# Patient Record
Sex: MALE | Race: WHITE | NOT HISPANIC OR LATINO | Employment: STUDENT | URBAN - METROPOLITAN AREA
[De-identification: names, ages, dates, MRNs, and addresses within clinical notes are randomized per-mention and may not be internally consistent; named-entity substitution may affect disease eponyms.]

---
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
## 2021-01-08 ENCOUNTER — TELEMEDICINE (OUTPATIENT)
Dept: FAMILY MEDICINE CLINIC | Facility: CLINIC | Age: 21
End: 2021-01-08
Payer: COMMERCIAL

## 2021-01-08 DIAGNOSIS — Z20.822 CLOSE EXPOSURE TO COVID-19 VIRUS: Primary | ICD-10-CM

## 2021-01-08 PROCEDURE — 99213 OFFICE O/P EST LOW 20 MIN: CPT | Performed by: NURSE PRACTITIONER

## 2021-01-08 NOTE — PROGRESS NOTES
COVID-19 Virtual Visit     Assessment/Plan:    Problem List Items Addressed This Visit     None      Visit Diagnoses     Close exposure to COVID-19 virus    -  Primary    Relevant Orders    Novel Coronavirus (COVID-19), PCR [facility] - Collected at [facility] or [facility]         Disposition:     I recommended COVID-19 PCR testing on or after day 5 since last exposure and if negative can end quarantine after 7 days  Patient was instructed to watch for symptoms until 14 days after exposure  If patient were to develop symptoms, they should immediately self isolate and call our office for further guidance  I referred patient to one of our centralized sites for a COVID-19 swab  I have spent 15 minutes directly with the patient  Greater than 50% of this time was spent in counseling/coordination of care regarding: instructions for management, patient and family education and impressions  Encounter provider CONSTANTINE Parisi    Provider located at [address] [place] [address]  [phone]    Recent Visits  No visits were found meeting these conditions  Showing recent visits within past 7 days and meeting all other requirements     Today's Visits  Date Type Provider Dept   01/08/21 [facility]   Showing today's visits and meeting all other requirements     Future Appointments  No visits were found meeting these conditions  Showing future appointments within next 150 days and meeting all other requirements        Patient agrees to participate in a virtual check in via telephone or video visit instead of presenting to the office to address urgent/immediate medical needs  Patient is aware this is a billable service  After connecting through Telephone, the patient was identified by name and date of birth   Collie Neigh was informed that this was a telemedicine visit and that the exam was being conducted confidentially over secure lines  Rajwinder Guzman acknowledged consent and understanding of privacy and security of the telemedicine visit  I informed the patient that I have reviewed his record in Epic and presented the opportunity for him to ask any questions regarding the visit today  The patient agreed to participate  It was my intent to perform this visit via video technology but the patient was not able to do a video connection so the visit was completed via audio telephone only  Subjective:   Rajwinder Guzman is a 24 y o  male who is concerned about COVID-19  Patient is currently asymptomatic  Patient denies fever, chills, fatigue, malaise, congestion, rhinorrhea, sore throat, anosmia, loss of taste, cough, shortness of breath, chest tightness, abdominal pain, nausea, vomiting, diarrhea, myalgias and headaches       Date of exposure: 1/6/2021    Exposure:   Contact with a person who is under investigation (PUI) for or who is positive for COVID-19 within the last 14 days?: Yes    Hospitalized recently for fever and/or lower respiratory symptoms?: No      Currently a healthcare worker that is involved in direct patient care?: No      Works in a special setting where the risk of COVID-19 transmission may be high? (this may include long-term care, correctional and intermediate facilities; homeless shelters; assisted-living facilities and group homes ): No      Resident in a special setting where the risk of COVID-19 transmission may be high? (this may include long-term care, correctional and intermediate facilities; homeless shelters; assisted-living facilities and group homes ): No      No results found for: [address] [facility] [address] [facility], [address] [facility] [address]  Past Medical History:   Diagnosis Date    Allergic      Past Surgical History:   Procedure Laterality Date    MYRINGOTOMY W/ TUBES       Current Outpatient Medications   Medication Sig Dispense Refill    fluticasone (FLONASE) 50 mcg/act nasal spray 2 sprays into each nostril daily 16 g 0    loratadine (CLARITIN) 5 MG chewable tablet Chew 1 tablet daily      MELATONIN GUMMIES PO Take 10 mg by mouth 2 at bedtime      terbinafine (LamISIL) 250 mg tablet   0     No current facility-administered medications for this visit  Allergies   Allergen Reactions    Sulfa Antibiotics      Pt unsure of reaction       Review of Systems   Constitutional: Negative for chills, fatigue and fever  HENT: Negative for congestion, rhinorrhea and sore throat  Respiratory: Negative for cough, chest tightness and shortness of breath  Gastrointestinal: Negative for abdominal pain, diarrhea, nausea and vomiting  Musculoskeletal: Negative for myalgias  Neurological: Negative for headaches  Objective: There were no vitals filed for this visit  It was my intent to perform this visit via video technology but the patient was not able to do a video connection so the visit was completed via audio telephone only  VIRTUAL VISIT DISCLAIMER    Luis Salgado acknowledges that he has consented to an online visit or consultation  He understands that the online visit is based solely on information provided by him, and that, in the absence of a face-to-face physical evaluation by the physician, the diagnosis he receives is both limited and provisional in terms of accuracy and completeness  This is not intended to replace a full medical face-to-face evaluation by the physician  Luis Salgado understands and accepts these terms

## 2021-01-11 DIAGNOSIS — Z20.822 CLOSE EXPOSURE TO COVID-19 VIRUS: ICD-10-CM

## 2021-01-11 PROCEDURE — U0003 INFECTIOUS AGENT DETECTION BY NUCLEIC ACID (DNA OR RNA); SEVERE ACUTE RESPIRATORY SYNDROME CORONAVIRUS 2 (SARS-COV-2) (CORONAVIRUS DISEASE [COVID-19]), AMPLIFIED PROBE TECHNIQUE, MAKING USE OF HIGH THROUGHPUT TECHNOLOGIES AS DESCRIBED BY CMS-2020-01-R: HCPCS | Performed by: NURSE PRACTITIONER

## 2021-01-11 PROCEDURE — U0005 INFEC AGEN DETEC AMPLI PROBE: HCPCS | Performed by: NURSE PRACTITIONER

## 2021-01-12 ENCOUNTER — TELEPHONE (OUTPATIENT)
Dept: FAMILY MEDICINE CLINIC | Facility: CLINIC | Age: 21
End: 2021-01-12

## 2021-01-12 LAB — SARS-COV-2 RNA SPEC QL NAA+PROBE: NOT DETECTED

## 2021-01-12 NOTE — TELEPHONE ENCOUNTER
Eugenio's dad called to see if Eugenio's covid results were in yet  Relayed Jaylyn's message to Joselyn Mendoza       Please record

## 2021-01-19 DIAGNOSIS — Z20.828 SARS-ASSOCIATED CORONAVIRUS EXPOSURE: Primary | ICD-10-CM

## 2021-01-20 LAB — SARS-COV-2 IGG SERPL QL IA: NEGATIVE

## 2021-05-03 ENCOUNTER — TELEPHONE (OUTPATIENT)
Dept: FAMILY MEDICINE CLINIC | Facility: CLINIC | Age: 21
End: 2021-05-03

## 2021-05-03 NOTE — TELEPHONE ENCOUNTER
Needs to have his Meningococcal shot for college    Please pend the order    He is scheduled for a nurse visit on 5/7

## 2021-05-07 ENCOUNTER — CLINICAL SUPPORT (OUTPATIENT)
Dept: FAMILY MEDICINE CLINIC | Facility: CLINIC | Age: 21
End: 2021-05-07
Payer: COMMERCIAL

## 2021-05-07 DIAGNOSIS — Z23 NEED FOR MENINGITIS VACCINATION: Primary | ICD-10-CM

## 2021-05-07 PROCEDURE — 90471 IMMUNIZATION ADMIN: CPT

## 2021-05-07 PROCEDURE — 90734 MENACWYD/MENACWYCRM VACC IM: CPT

## 2021-07-07 ENCOUNTER — APPOINTMENT (OUTPATIENT)
Dept: RADIOLOGY | Facility: CLINIC | Age: 21
End: 2021-07-07
Payer: COMMERCIAL

## 2021-07-07 ENCOUNTER — OFFICE VISIT (OUTPATIENT)
Dept: OBGYN CLINIC | Facility: CLINIC | Age: 21
End: 2021-07-07
Payer: COMMERCIAL

## 2021-07-07 VITALS — SYSTOLIC BLOOD PRESSURE: 126 MMHG | HEART RATE: 68 BPM | DIASTOLIC BLOOD PRESSURE: 78 MMHG

## 2021-07-07 DIAGNOSIS — S82.392A CLOSED FRACTURE OF POSTERIOR MALLEOLUS OF LEFT TIBIA, INITIAL ENCOUNTER: ICD-10-CM

## 2021-07-07 DIAGNOSIS — M25.572 PAIN, JOINT, ANKLE AND FOOT, LEFT: Primary | ICD-10-CM

## 2021-07-07 DIAGNOSIS — M25.572 PAIN, JOINT, ANKLE AND FOOT, LEFT: ICD-10-CM

## 2021-07-07 PROCEDURE — 99204 OFFICE O/P NEW MOD 45 MIN: CPT | Performed by: ORTHOPAEDIC SURGERY

## 2021-07-07 PROCEDURE — 1036F TOBACCO NON-USER: CPT | Performed by: ORTHOPAEDIC SURGERY

## 2021-07-07 PROCEDURE — 27767 CLTX POST ANKLE FX: CPT | Performed by: ORTHOPAEDIC SURGERY

## 2021-07-07 PROCEDURE — 73610 X-RAY EXAM OF ANKLE: CPT

## 2021-07-07 NOTE — LETTER
July 7, 2021     Patient: Antony Hubbard   YOB: 2000   Date of Visit: 7/7/2021       To Whom it May Concern:    Randa Houser is under my professional care  He was seen in my office on 7/7/2021  He is restricted from work at this time  Next visit in 4 weeks  If you have any questions or concerns, please don't hesitate to call  Sincerely,          Lowell Goff MD        CC: Randolph Gutierrez

## 2021-07-07 NOTE — PROGRESS NOTES
Assessment/Plan:  1  Pain, joint, ankle and foot, left  XR ankle 3+ vw left   2  Closed fracture of posterior malleolus of left tibia, initial encounter  Fracture / Dislocation Treatment       Scribe Attestation    I,:  Crispinjossie Denis Call am acting as a scribe while in the presence of the attending physician :       I,:  Ashley Khalil MD personally performed the services described in this documentation    as scribed in my presence :       Fracture / Dislocation Treatment    Date/Time: 7/7/2021 3:55 PM  Performed by: Ashley Khalil MD  Authorized by: Ashley Khalil MD     Patient Location:  Clinic  Other Assisting Provider: No    Verbal consent obtained?: Yes    Risks and benefits: Risks, benefits and alternatives were discussed    Consent given by:  Patient  Patient states understanding of procedure being performed: Yes    Patient's understanding of procedure matches consent: Yes    Patient identity confirmed:  Verbally with patient  Time out: Immediately prior to the procedure a time out was called    Injury location:  Ankle  Location details:  Left ankle  Injury type:  Fracture  Fracture type: posterior malleolus    Neurovascular status: Neurovascularly intact    Distal perfusion: normal    Neurological function: normal    Range of motion: normal    Local anesthesia used?: No    Manipulation performed?: No    Immobilization:  Other (comment) (Short cam walker)  Neurovascular status: Neurovascularly intact    Distal perfusion: normal    Neurological function: normal    Range of motion: normal    Patient tolerance:  Patient tolerated the procedure well with no immediate complications          Upon review of the x-rays and my physical examination Evelyn Fajardo has a small nondisplaced fracture located the posterior malleolus  This will heal well non operative treatment  He was fitted with a short Cam walker boot  He was instructed on its use and care  He can bear weight as tolerated    He is currently working in construction  I do not feel he is fit to return to work at this time and have restricted him for the next 4 weeks  I will see him back in 4 weeks for repeat x-rays  Subjective:   Montrell Hodge is a 24 y o  male who presents to the office today for evaluation of his left ankle  Ten days ago he was vacationing in [facility]  He was on top of a lifeguard  stand and a friend attempted to tip it over  Lety Slot jumped from the stand landing on the left ankle in an inverted position  He developed immediate and sharp pain at the lateral aspect of the left ankle that would radiate posteriorly  He was unable to bear weight on the left lower extremity  He states he did receive x-rays in the emergency department the next day which were found to be negative for fracture  He does not have a copy of those images or a report  Today his chief complaint is of a persistent mild to moderate ache in the posterior aspect of the left ankle that will radiate to the lateral region  Bearing weight on the left lower extremity will cause an exacerbation of his symptoms and they can become more moderate and sharp  He has been using crutches since his injury  He will apply ice and elevate the lower extremity which seems to help  He denies distal paresthesias  Review of Systems   Constitutional: Negative for chills, fever and unexpected weight change  HENT: Negative for hearing loss, nosebleeds and sore throat  Eyes: Negative for pain, redness and visual disturbance  Respiratory: Negative for cough, shortness of breath and wheezing  Cardiovascular: Negative for chest pain, palpitations and leg swelling  Gastrointestinal: Negative for abdominal pain, nausea and vomiting  Endocrine: Negative for polyphagia and polyuria  Genitourinary: Negative for dysuria and hematuria  Musculoskeletal:        See HPI   Skin: Negative for rash and wound  Neurological: Negative for dizziness, numbness and headaches  Psychiatric/Behavioral: Negative for decreased concentration and suicidal ideas  The patient is not nervous/anxious  Past Medical History:   Diagnosis Date    Allergic        Past Surgical History:   Procedure Laterality Date    MYRINGOTOMY W/ TUBES         Family History   Problem Relation Age of Onset    No Known Problems Mother     Hypertension Father         [employer]    Substance Abuse Neg Hx     Mental illness Neg Hx        Social History     Occupational History    Not on file   Tobacco Use    Smoking status: Never Smoker    Smokeless tobacco: Never Used   Vaping Use    Vaping Use: Never used   Substance and Sexual Activity    Alcohol use: Yes    Drug use: No    Sexual activity: Not on file         Current Outpatient Medications:     fluticasone (FLONASE) 50 mcg/act nasal spray, 2 sprays into each nostril daily, Disp: 16 g, Rfl: 0    loratadine (CLARITIN) 5 MG chewable tablet, Chew 1 tablet daily, Disp: , Rfl:     MELATONIN GUMMIES PO, Take 10 mg by mouth 2 at bedtime (Patient not taking: Reported on 7/7/2021), Disp: , Rfl:     terbinafine (LamISIL) 250 mg tablet, , Disp: , Rfl: 0    Allergies   Allergen Reactions    Sulfa Antibiotics      Pt unsure of reaction       Objective:  Vitals:    07/07/21 1511   BP: 126/78   Pulse: 68       Left Ankle Exam     Tenderness   Left ankle tenderness location: Posterior malleolus  Swelling: moderate    Range of Motion   Dorsiflexion: 10   Plantar flexion: 40   Eversion: normal   Inversion: normal     Muscle Strength   Left ankle normal muscle strength: All 4+/5  Dorsiflexion:  4/5   Plantar flexion:  4/5   Anterior tibial:  4/5   Posterior tibial:  4/5  Gastrocsoleus:  4/5  Peroneal muscle:  4/5    Tests   Anterior drawer: negative  Varus tilt: negative    Other   Erythema: absent  Scars: absent  Sensation: normal  Pulse: present (2+ DP)    Comments:  Ecchymosis present          Observations   Left Ankle/Foot   Negative for adhesive scar  Strength/Myotome Testing     Left Ankle/Foot   Dorsiflexion: 4  Plantar flexion: 4      Physical Exam  Vitals reviewed  Constitutional:       Appearance: He is well-developed  HENT:      Head: Normocephalic and atraumatic  Eyes:      General:         Right eye: No discharge  Left eye: No discharge  Conjunctiva/sclera: Conjunctivae normal    Cardiovascular:      Rate and Rhythm: Regular rhythm  Pulmonary:      Effort: Pulmonary effort is normal  No respiratory distress  Musculoskeletal:      Cervical back: Normal range of motion and neck supple  Skin:     General: Skin is warm and dry  Neurological:      Mental Status: He is alert and oriented to person, place, and time  Psychiatric:         Behavior: Behavior normal            I have personally reviewed pertinent films in PACS and my interpretation is as follows:  X-rays of the left ankle demonstrate a small nondisplaced fracture at the posterior malleolus  There is no other osseous abnormality

## 2021-07-14 ENCOUNTER — TELEPHONE (OUTPATIENT)
Dept: OBGYN CLINIC | Facility: CLINIC | Age: 21
End: 2021-07-14

## 2021-07-14 ENCOUNTER — TELEPHONE (OUTPATIENT)
Dept: FAMILY MEDICINE CLINIC | Facility: CLINIC | Age: 21
End: 2021-07-14

## 2021-07-15 NOTE — TELEPHONE ENCOUNTER
COMPLETED ONLINE DISABILITY  LVM ADVISING THIS WAS COMPLETED AND A COPY IS AVAILABLE FOR  AT  [facility] OFFICE)

## 2021-08-04 ENCOUNTER — OFFICE VISIT (OUTPATIENT)
Dept: OBGYN CLINIC | Facility: CLINIC | Age: 21
End: 2021-08-04

## 2021-08-04 ENCOUNTER — APPOINTMENT (OUTPATIENT)
Dept: RADIOLOGY | Facility: CLINIC | Age: 21
End: 2021-08-04
Payer: COMMERCIAL

## 2021-08-04 DIAGNOSIS — S82.392A CLOSED FRACTURE OF POSTERIOR MALLEOLUS OF LEFT TIBIA, INITIAL ENCOUNTER: ICD-10-CM

## 2021-08-04 DIAGNOSIS — S82.392D CLOSED FRACTURE OF POSTERIOR MALLEOLUS OF LEFT TIBIA WITH ROUTINE HEALING, SUBSEQUENT ENCOUNTER: Primary | ICD-10-CM

## 2021-08-04 PROCEDURE — 99024 POSTOP FOLLOW-UP VISIT: CPT | Performed by: ORTHOPAEDIC SURGERY

## 2021-08-04 PROCEDURE — 73610 X-RAY EXAM OF ANKLE: CPT

## 2021-08-04 NOTE — LETTER
August 4, 2021     Patient: Berenice Dobbs   YOB: 2000   Date of Visit: 8/4/2021       To Whom it May Concern:    Nickolas Mora is under my professional care  He was seen in my office on 8/4/2021  He is cleared for work on 8/9/21  If you have any questions or concerns, please don't hesitate to call           Sincerely,          Chuyita Hilliard MD        CC: No Recipients

## 2021-08-04 NOTE — PROGRESS NOTES
Assessment/Plan:  1  Closed fracture of posterior malleolus of left tibia with routine healing, subsequent encounter  XR ankle 3+ vw left     The patient is doing well and I can not appreciate his small fracture on xrays today  He can discontinue his boot at this point and is cleared to return to work on Monday  He can gradually increase his activity as tolerated  He will follow-up as needed  Subjective:   Melvin Schmid is a 24 y o  male who presents today for follow-up of his left ankle, now about 4 weeks status post closed treatment of posterior malleolus fracture  He has been WBAT in his boot  He denies any pain with ambulation in his boot  He has also not some ambulating out of his boot, with only some mild soreness with certain steps  Review of Systems      Past Medical History:   Diagnosis Date    Allergic        Past Surgical History:   Procedure Laterality Date    MYRINGOTOMY W/ TUBES         Family History   Problem Relation Age of Onset    No Known Problems Mother     Hypertension Father         [employer]    Substance Abuse Neg Hx     Mental illness Neg Hx        Social History     Occupational History    Not on file   Tobacco Use    Smoking status: Never Smoker    Smokeless tobacco: Never Used   Vaping Use    Vaping Use: Never used   Substance and Sexual Activity    Alcohol use: Yes    Drug use: No    Sexual activity: Not on file         Current Outpatient Medications:     fluticasone (FLONASE) 50 mcg/act nasal spray, 2 sprays into each nostril daily, Disp: 16 g, Rfl: 0    loratadine (CLARITIN) 5 MG chewable tablet, Chew 1 tablet daily, Disp: , Rfl:     MELATONIN GUMMIES PO, Take 10 mg by mouth 2 at bedtime (Patient not taking: Reported on 7/7/2021), Disp: , Rfl:     terbinafine (LamISIL) 250 mg tablet, , Disp: , Rfl: 0    Allergies   Allergen Reactions    Sulfa Antibiotics      Pt unsure of reaction       Objective: There were no vitals filed for this visit      Left Ankle Exam     Tenderness   The patient is experiencing no tenderness  Swelling: none    Range of Motion   Dorsiflexion: 20   Plantar flexion: 30     Other   Erythema: absent  Sensation: normal  Pulse: present            Physical Exam    I have personally reviewed pertinent films in PACS and my interpretation is as follows:  Xrays left ankle: Negative

## 2021-09-14 ENCOUNTER — TELEMEDICINE (OUTPATIENT)
Dept: FAMILY MEDICINE CLINIC | Facility: CLINIC | Age: 21
End: 2021-09-14
Payer: COMMERCIAL

## 2021-09-14 VITALS — WEIGHT: 147 LBS | TEMPERATURE: 98.8 F | BODY MASS INDEX: 25.1 KG/M2 | HEIGHT: 64 IN

## 2021-09-14 DIAGNOSIS — J02.9 SORE THROAT: ICD-10-CM

## 2021-09-14 DIAGNOSIS — R05.9 COUGH: ICD-10-CM

## 2021-09-14 DIAGNOSIS — J06.9 VIRAL UPPER RESPIRATORY TRACT INFECTION: Primary | ICD-10-CM

## 2021-09-14 DIAGNOSIS — R09.89 CHEST CONGESTION: ICD-10-CM

## 2021-09-14 PROCEDURE — 99213 OFFICE O/P EST LOW 20 MIN: CPT | Performed by: NURSE PRACTITIONER

## 2021-09-14 PROCEDURE — U0005 INFEC AGEN DETEC AMPLI PROBE: HCPCS | Performed by: NURSE PRACTITIONER

## 2021-09-14 PROCEDURE — 3725F SCREEN DEPRESSION PERFORMED: CPT | Performed by: NURSE PRACTITIONER

## 2021-09-14 PROCEDURE — U0003 INFECTIOUS AGENT DETECTION BY NUCLEIC ACID (DNA OR RNA); SEVERE ACUTE RESPIRATORY SYNDROME CORONAVIRUS 2 (SARS-COV-2) (CORONAVIRUS DISEASE [COVID-19]), AMPLIFIED PROBE TECHNIQUE, MAKING USE OF HIGH THROUGHPUT TECHNOLOGIES AS DESCRIBED BY CMS-2020-01-R: HCPCS | Performed by: NURSE PRACTITIONER

## 2021-09-14 PROCEDURE — 3008F BODY MASS INDEX DOCD: CPT | Performed by: NURSE PRACTITIONER

## 2021-09-14 RX ORDER — CETIRIZINE HYDROCHLORIDE 10 MG/1
10 TABLET, CHEWABLE ORAL DAILY
COMMUNITY

## 2021-09-14 NOTE — PROGRESS NOTES
COVID-19 Outpatient Progress Note    Assessment/Plan:    Problem List Items Addressed This Visit     None      Visit Diagnoses     Viral upper respiratory tract infection    -  Primary    Recommend supportive care only at this time  Afebrile  Not vaccinated for covid  Will evaluate for covid infection  Cough        Relevant Orders    Novel Coronavirus (Covid-19),PCR [facility] - Collected at [facility]    Sore throat        Relevant Orders    Novel Coronavirus (Covid-19),PCR [facility] - Collected at [facility]    Chest congestion        Relevant Medications    cetirizine (ZyrTEC) 10 MG chewable tablet    Other Relevant Orders    Novel Coronavirus (Covid-19),PCR [facility] - Collected at [facility]         Increase fluid intake as tolerated  Rest and humidification   Continue medications as directed   - antibiotic for full course  - pro-biotic to protect stomach while on medication   - Flonase OTC 1-2 sprays each nostril daily PRN post nasal drip   - Mucinex OTC to loosen secretions   Return to office in one week if symptoms persist or worsen        Disposition:     I referred patient to one of our centralized sites for a COVID-19 swab  I have spent 15 minutes directly with the patient  Greater than 50% of this time was spent in counseling/coordination of care regarding: instructions for management and impressions  Verification of patient location:    Patient is located in the following state in which I hold an active license [place]    Encounter provider [facility]    Provider located at [address] [place] [address]  [id number]    Recent Visits  No visits were found meeting these conditions    Showing recent visits within past 7 days and meeting all other requirements  Today's Visits  Date Type Provider Dept   09/14/21 Telemedicine [facility], [address] [id number] [facility]   Showing today's visits and meeting all other requirements  Future Appointments  No visits were found meeting these conditions  Showing future appointments within next 150 days and meeting all other requirements     This virtual check-in was done via [employer] and patient was informed that this is a secure, HIPAA-compliant platform  He agrees to proceed  Patient agrees to participate in a virtual check in via telephone or video visit instead of presenting to the office to address urgent/immediate medical needs  Patient is aware this is a billable service  After connecting through [facility], the patient was identified by name and date of birth  Bella Jimenez was informed that this was a telemedicine visit and that the exam was being conducted confidentially over secure lines  My office door was closed  No one else was in the room  Bella Jimenez acknowledged consent and understanding of privacy and security of the telemedicine visit  I informed the patient that I have reviewed his record in Epic and presented the opportunity for him to ask any questions regarding the visit today  The patient agreed to participate  Subjective:   Bella Jimenez is a 24 y o  male who is concerned about COVID-19  Patient's symptoms include nasal congestion, cough and chest tightness (chest congestion)  Patient denies fever, chills, fatigue, malaise, rhinorrhea, sore throat, anosmia, loss of taste, shortness of breath, abdominal pain, nausea, vomiting, diarrhea, myalgias and headaches  Date of symptom onset: 9/13/2021  COVID-19 vaccination status: Not vaccinated    Exposure:   Contact with a person who is under investigation (PUI) for or who is positive for COVID-19 within the last 14 days?: No    Hospitalized recently for fever and/or lower respiratory symptoms?: No      Currently a healthcare worker that is involved in direct patient care?: No      Works in a special setting where the risk of COVID-19 transmission may be high? (this may include long-term care, correctional and FDC facilities; homeless shelters; assisted-living facilities and group homes ): No      Resident in a special setting where the risk of COVID-19 transmission may be high? (this may include long-term care, correctional and FDC facilities; homeless shelters; assisted-living facilities and group homes ): No      Lab Results   Component Value Date    SARSCOV2 Not Detected 01/11/2021     Past Medical History:   Diagnosis Date    Allergic      Past Surgical History:   Procedure Laterality Date    MYRINGOTOMY W/ TUBES       Current Outpatient Medications   Medication Sig Dispense Refill    cetirizine (ZyrTEC) 10 MG chewable tablet Chew 10 mg daily      fluticasone (FLONASE) 50 mcg/act nasal spray 2 sprays into each nostril daily 16 g 0    loratadine (CLARITIN) 5 MG chewable tablet Chew 1 tablet daily (Patient not taking: Reported on 9/14/2021)      MELATONIN GUMMIES PO Take 10 mg by mouth 2 at bedtime (Patient not taking: Reported on 7/7/2021)      terbinafine (LamISIL) 250 mg tablet  (Patient not taking: Reported on 7/7/2021)  0     No current facility-administered medications for this visit  Allergies   Allergen Reactions    Sulfa Antibiotics      Pt unsure of reaction       Review of Systems   Constitutional: Negative for chills, fatigue and fever  HENT: Positive for congestion  Negative for postnasal drip, rhinorrhea, sinus pressure, sinus pain and sore throat  Respiratory: Positive for cough and chest tightness (chest congestion)  Negative for shortness of breath  Cardiovascular: Negative for chest pain  Gastrointestinal: Negative for abdominal pain, diarrhea, nausea and vomiting  Musculoskeletal: Negative for myalgias  Neurological: Negative for headaches         Objective:    Vitals:    09/14/21 0836   Temp: 98 8 °F (37 1 °C)   TempSrc: Oral   Weight: 66 7 kg (147 lb)   Height: 5' 4" (1 626 m)       Physical Exam  Vitals reviewed  Constitutional:       Appearance: Normal appearance  He is ill-appearing  HENT:      Head: Normocephalic and atraumatic  Neurological:      Mental Status: He is alert and oriented to person, place, and time  Psychiatric:         Mood and Affect: Mood normal          VIRTUAL VISIT [address] verbally agrees to participate in [employer]  Pt is aware that [employer] could be limited without vital signs or the ability to perform a full hands-on physical exam  Eugenio Randle understands he or the provider may request at any time to terminate the video visit and request the patient to seek care or treatment in person

## 2022-03-31 ENCOUNTER — OFFICE VISIT (OUTPATIENT)
Dept: FAMILY MEDICINE CLINIC | Facility: CLINIC | Age: 22
End: 2022-03-31
Payer: COMMERCIAL

## 2022-03-31 VITALS
HEART RATE: 72 BPM | RESPIRATION RATE: 12 BRPM | OXYGEN SATURATION: 99 % | WEIGHT: 153 LBS | HEIGHT: 64 IN | TEMPERATURE: 97.7 F | DIASTOLIC BLOOD PRESSURE: 70 MMHG | SYSTOLIC BLOOD PRESSURE: 114 MMHG | BODY MASS INDEX: 26.12 KG/M2

## 2022-03-31 DIAGNOSIS — J30.1 SEASONAL ALLERGIC RHINITIS DUE TO POLLEN: ICD-10-CM

## 2022-03-31 DIAGNOSIS — Z00.00 ENCOUNTER FOR ANNUAL GENERAL MEDICAL EXAMINATION WITHOUT ABNORMAL FINDINGS IN ADULT: Primary | ICD-10-CM

## 2022-03-31 PROCEDURE — 99395 PREV VISIT EST AGE 18-39: CPT | Performed by: NURSE PRACTITIONER

## 2022-03-31 RX ORDER — DIPHENOXYLATE HYDROCHLORIDE AND ATROPINE SULFATE 2.5; .025 MG/1; MG/1
1 TABLET ORAL DAILY
COMMUNITY

## 2022-03-31 NOTE — PROGRESS NOTES
FAMILY [facility] HEALTH MAINTENANCE OFFICE VISIT  [facility]    NAME: Melissa Bella  AGE: 22 y o  SEX: male  : 2000     DATE: 3/31/2022    Assessment and Plan     1  Encounter for annual general medical examination without abnormal findings in adult  Comments:  Age appropriate screenings and recommendations discussed  2  Seasonal allergic rhinitis due to pollen  Assessment & Plan:  Taking flonase and zyrtec daily  Symptoms stable, well-controlled  Completed physical forms  · Patient Counseling:   · Nutrition: Stressed importance of a well balanced diet, moderation of sodium/saturated fat, caloric balance and sufficient intake of fiber  · Exercise: Stressed the importance of regular exercise with a goal of 150 minutes per week  · Dental Health: Discussed daily flossing and brushing and regular dental visits   · Sexuality: Discussed sexually transmitted infections, use of condoms and prevention of unintended pregnancy  · Alcohol Use:  Recommended moderation of alcohol intake  · Injury Prevention: Discussed Safety Belts, Safety Helmets, and Smoke Detectors    · Immunizations reviewed: Risks and Benefits discussed and Declined recommended vaccinations  · Discussed benefits of:  Screening labs   BMI Counseling: Body mass index is 26 26 kg/m²  Discussed with patient's BMI with him  No follow-ups on file          Chief Complaint     Chief Complaint   Patient presents with    Annual Exam       History of Present Illness     HPI    Well Adult Physical   Patient here for a comprehensive physical exam       Diet and Physical Activity  Diet: well balanced diet  Exercise: frequently      Depression Screen  PHQ-2/9 Depression Screening    Little interest or pleasure in doing things: 0 - not at all  Feeling down, depressed, or hopeless: 0 - not at all  PHQ-2 Score: 0  PHQ-2 Interpretation: Negative depression screen          General Health  Hearing: Normal:  bilateral  Vision: no vision problems  Dental: regular dental visits    Reproductive Health  No issues       The following portions of the patient's history were reviewed and updated as appropriate: allergies, current medications, past family history, past medical history, past social history, past surgical history and problem list     Review of Systems     Review of Systems   Constitutional: Negative for diaphoresis, fatigue and fever  HENT: Negative for ear pain and hearing loss  Eyes: Negative for pain and visual disturbance  Respiratory: Negative for chest tightness and shortness of breath  Cardiovascular: Negative for chest pain, palpitations and leg swelling  Gastrointestinal: Negative for abdominal pain, constipation and diarrhea  Genitourinary: Negative for difficulty urinating  Musculoskeletal: Negative for arthralgias and myalgias  Skin: Negative for rash  Neurological: Negative for dizziness, numbness and headaches  Psychiatric/Behavioral: Negative for sleep disturbance  Past Medical History     Past Medical History:   Diagnosis Date    Allergic        Past Surgical History     Past Surgical History:   Procedure Laterality Date    MYRINGOTOMY W/ TUBES         Social History     Social History     Socioeconomic History    Marital status: Single     Spouse name: None    Number of children: None    Years of education: None    Highest education level: None   Occupational History    None   Tobacco Use    Smoking status: Never Smoker    Smokeless tobacco: Never Used   Vaping Use    Vaping Use: Never used   Substance and Sexual Activity    Alcohol use:  Yes    Drug use: No    Sexual activity: None   Other Topics Concern    None   Social History Narrative    None     Social Determinants of Health     Financial Resource Strain: Not on file   Food Insecurity: Not on file   Transportation Needs: Not on file   Physical Activity: Not on file   Stress: Not on file   Social Connections: Not on file   Intimate Partner Violence: Not on file   Housing Stability: Not on file       Family History     Family History   Problem Relation Age of Onset    No Known Problems Mother     Hypertension Father         [employer]    Substance Abuse Neg Hx     Mental illness Neg Hx        Current Medications       Current Outpatient Medications:     cetirizine (ZyrTEC) 10 MG chewable tablet, Chew 10 mg daily, Disp: , Rfl:     Ferrous Sulfate (IRON PO), Take by mouth in the morning, Disp: , Rfl:     fluticasone (FLONASE) 50 mcg/act nasal spray, 2 sprays into each nostril daily, Disp: 16 g, Rfl: 0    multivitamin (THERAGRAN) TABS, Take 1 tablet by mouth daily, Disp: , Rfl:     Omega-3 Fatty Acids (FISH OIL PO), Take by mouth in the morning, Disp: , Rfl:      Allergies     Allergies   Allergen Reactions    Sulfa Antibiotics      Pt unsure of reaction       Objective     /70 (BP Location: Left arm, Patient Position: Sitting, Cuff Size: Adult)   Pulse 72   Temp 97 7 °F (36 5 °C) (Temporal)   Resp 12   Ht 5' 4" (1 626 m)   Wt 69 4 kg (153 lb)   SpO2 99%   BMI 26 26 kg/m²      Physical Exam  Vitals reviewed  Constitutional:       General: He is not in acute distress  Appearance: Normal appearance  He is well-developed  He is not diaphoretic  HENT:      Head: Normocephalic and atraumatic  Right Ear: Tympanic membrane, ear canal and external ear normal       Left Ear: Tympanic membrane, ear canal and external ear normal    Eyes:      General: Lids are normal       Extraocular Movements: Extraocular movements intact  Conjunctiva/sclera: Conjunctivae normal       Pupils: Pupils are equal, round, and reactive to light  Pupils are equal       Funduscopic exam:     Right eye: No hemorrhage or exudate  Red reflex present  Left eye: No hemorrhage or exudate  Red reflex present  Neck:      Thyroid: No thyroid mass or thyromegaly        Vascular: No carotid bruit  Cardiovascular:      Rate and Rhythm: Normal rate and regular rhythm  Pulses: Normal pulses  Heart sounds: Normal heart sounds, S1 normal and S2 normal  No murmur heard  Pulmonary:      Effort: Pulmonary effort is normal       Breath sounds: Normal breath sounds  No decreased breath sounds, wheezing, rhonchi or rales  Chest:   Breasts:      Right: No supraclavicular adenopathy  Left: No supraclavicular adenopathy  Abdominal:      General: Bowel sounds are normal  There is no distension  Palpations: Abdomen is soft  There is no hepatomegaly or splenomegaly  Tenderness: There is no abdominal tenderness  Genitourinary:     Penis: Normal        Testes: Normal       Prostate: Normal       Rectum: Normal    Musculoskeletal:         General: No tenderness or deformity  Normal range of motion  Cervical back: Full passive range of motion without pain, normal range of motion and neck supple  Right lower leg: No edema  Left lower leg: No edema  Lymphadenopathy:      Cervical: No cervical adenopathy  Upper Body:      Right upper body: No supraclavicular adenopathy  Left upper body: No supraclavicular adenopathy  Skin:     General: Skin is warm and dry  Findings: No rash  Neurological:      Mental Status: He is alert and oriented to person, place, and time  Cranial Nerves: No cranial nerve deficit  Coordination: Coordination normal       Deep Tendon Reflexes: Reflexes are normal and symmetric  Psychiatric:         Speech: Speech normal          Behavior: Behavior normal          Thought Content:  Thought content normal          Judgment: Judgment normal                Phu Hugo, [facility],[address]

## 2022-06-17 ENCOUNTER — OFFICE VISIT (OUTPATIENT)
Dept: FAMILY MEDICINE CLINIC | Facility: CLINIC | Age: 22
End: 2022-06-17
Payer: COMMERCIAL

## 2022-06-17 VITALS
RESPIRATION RATE: 16 BRPM | BODY MASS INDEX: 25.78 KG/M2 | SYSTOLIC BLOOD PRESSURE: 102 MMHG | OXYGEN SATURATION: 98 % | HEART RATE: 66 BPM | TEMPERATURE: 97.8 F | HEIGHT: 64 IN | WEIGHT: 151 LBS | DIASTOLIC BLOOD PRESSURE: 70 MMHG

## 2022-06-17 DIAGNOSIS — J30.2 SEASONAL ALLERGIES: Primary | ICD-10-CM

## 2022-06-17 DIAGNOSIS — J34.2 NASAL SEPTAL DEVIATION: ICD-10-CM

## 2022-06-17 DIAGNOSIS — J30.1 SEASONAL ALLERGIC RHINITIS DUE TO POLLEN: ICD-10-CM

## 2022-06-17 PROCEDURE — 3008F BODY MASS INDEX DOCD: CPT | Performed by: NURSE PRACTITIONER

## 2022-06-17 PROCEDURE — 1036F TOBACCO NON-USER: CPT | Performed by: NURSE PRACTITIONER

## 2022-06-17 PROCEDURE — 96372 THER/PROPH/DIAG INJ SC/IM: CPT | Performed by: NURSE PRACTITIONER

## 2022-06-17 PROCEDURE — 99214 OFFICE O/P EST MOD 30 MIN: CPT | Performed by: NURSE PRACTITIONER

## 2022-06-17 RX ORDER — TRIAMCINOLONE ACETONIDE 40 MG/ML
40 INJECTION, SUSPENSION INTRA-ARTICULAR; INTRAMUSCULAR ONCE
Status: COMPLETED | OUTPATIENT
Start: 2022-06-17 | End: 2022-06-17

## 2022-06-17 RX ADMIN — TRIAMCINOLONE ACETONIDE 40 MG: 40 INJECTION, SUSPENSION INTRA-ARTICULAR; INTRAMUSCULAR at 10:35

## 2022-06-17 NOTE — PROGRESS NOTES
Assessment/Plan:    1  Seasonal allergies  -     triamcinolone acetonide (KENALOG-40) 40 mg/mL injection 40 mg    2  Seasonal allergic rhinitis due to pollen  -     triamcinolone acetonide (KENALOG-40) 40 mg/mL injection 40 mg    3  Nasal septal deviation  -     Ambulatory Referral to Otolaryngology; Future          Return if symptoms worsen or fail to improve  Subjective:      Patient ID: Henrene Osler is a 22 y o  male  Chief Complaint   Patient presents with   Guanako Shari is a 22year old male who presents to the office for evaluation and management seasonal allergies  Reports associated symptoms include sneezing and runny nose with congestion, watery itchy eyes and occasionally having itchy ears  Pt reports taking Zyrtec which is helpful  The following portions of the patient's history were reviewed and updated as appropriate: allergies, current medications, past family history, past medical history, past social history, past surgical history and problem list     Review of Systems   Constitutional: Negative for chills, fatigue and fever  Respiratory: Negative for cough, chest tightness and shortness of breath  Cardiovascular: Negative for chest pain, palpitations and leg swelling  Current Outpatient Medications   Medication Sig Dispense Refill    cetirizine (ZyrTEC) 10 MG chewable tablet Chew 10 mg daily      Ferrous Sulfate (IRON PO) Take by mouth in the morning      fluticasone (FLONASE) 50 mcg/act nasal spray 2 sprays into each nostril daily 16 g 0    multivitamin (THERAGRAN) TABS Take 1 tablet by mouth daily      Omega-3 Fatty Acids (FISH OIL PO) Take by mouth in the morning       No current facility-administered medications for this visit  Objective:    /70   Pulse 66   Temp 97 8 °F (36 6 °C) (Temporal)   Resp 16   Ht 5' 4" (1 626 m)   Wt 68 5 kg (151 lb)   SpO2 98%   BMI 25 92 kg/m²        Physical Exam  Vitals reviewed     Constitutional: General: He is not in acute distress  Appearance: Normal appearance  He is well-developed  He is not diaphoretic  HENT:      Head: Normocephalic and atraumatic  Right Ear: Ear canal and external ear normal  No drainage, swelling or tenderness  No middle ear effusion  Tympanic membrane is scarred  Left Ear: Tympanic membrane, ear canal and external ear normal  No drainage, swelling or tenderness  No middle ear effusion  Nose: Septal deviation and rhinorrhea present  No mucosal edema  Rhinorrhea is clear  Right Sinus: No maxillary sinus tenderness or frontal sinus tenderness  Left Sinus: No maxillary sinus tenderness or frontal sinus tenderness  Mouth/Throat:      Pharynx: Uvula midline  No oropharyngeal exudate or posterior oropharyngeal erythema  Eyes:      General:         Right eye: No discharge  Left eye: No discharge  Conjunctiva/sclera: Conjunctivae normal    Neck:      Thyroid: No thyromegaly  Cardiovascular:      Rate and Rhythm: Normal rate and regular rhythm  Heart sounds: Normal heart sounds  Pulmonary:      Effort: Pulmonary effort is normal  No respiratory distress  Breath sounds: Normal breath sounds  No decreased breath sounds, wheezing, rhonchi or rales  Musculoskeletal:      Cervical back: Normal range of motion and neck supple  Lymphadenopathy:      Cervical: No cervical adenopathy  Skin:     General: Skin is warm and dry  Findings: No rash  Neurological:      Mental Status: He is alert and oriented to person, place, and time  Psychiatric:         Behavior: Behavior normal          Thought Content:  Thought content normal                 CONSTANTINE Mixon

## 2023-04-04 ENCOUNTER — OFFICE VISIT (OUTPATIENT)
Dept: FAMILY MEDICINE CLINIC | Facility: CLINIC | Age: 23
End: 2023-04-04

## 2023-04-04 VITALS
SYSTOLIC BLOOD PRESSURE: 112 MMHG | OXYGEN SATURATION: 98 % | DIASTOLIC BLOOD PRESSURE: 78 MMHG | RESPIRATION RATE: 12 BRPM | HEIGHT: 64 IN | WEIGHT: 152 LBS | BODY MASS INDEX: 25.95 KG/M2 | TEMPERATURE: 97.5 F | HEART RATE: 91 BPM

## 2023-04-04 DIAGNOSIS — J01.40 ACUTE NON-RECURRENT PANSINUSITIS: Primary | ICD-10-CM

## 2023-04-04 RX ORDER — AMOXICILLIN AND CLAVULANATE POTASSIUM 500; 125 MG/1; MG/1
1 TABLET, FILM COATED ORAL EVERY 12 HOURS SCHEDULED
Qty: 20 TABLET | Refills: 0 | Status: SHIPPED | OUTPATIENT
Start: 2023-04-04 | End: 2023-04-14

## 2023-04-04 NOTE — PROGRESS NOTES
Assessment/Plan:    1  Acute non-recurrent pansinusitis  -     amoxicillin-clavulanate (AUGMENTIN) 500-125 mg per tablet; Take 1 tablet by mouth every 12 (twelve) hours for 10 days            Patient Instructions   Increase fluid intake as tolerated  Rest and humidification   Continue medications as directed   - antibiotic for full course  - pro-biotic to protect stomach while on medication   - Flonase OTC 1-2 sprays each nostril daily PRN post nasal drip   - Mucinex OTC to loosen secretions   Return to office in one week if symptoms persist or worsen        Return if symptoms worsen or fail to improve  Subjective:      Patient ID: Nikkie Both is a 25 y o  male  Chief Complaint   Patient presents with   • Sore Throat     Pt has sore throat and sinus congestion for approximately 5 days  Sore Throat   This is a new problem  The current episode started in the past 7 days  The problem has been unchanged  There has been no fever  The pain is mild  Associated symptoms include congestion, coughing and a hoarse voice  Pertinent negatives include no abdominal pain, diarrhea, ear discharge, ear pain, headaches, plugged ear sensation, shortness of breath, trouble swallowing or vomiting  He has tried nothing for the symptoms  The following portions of the patient's history were reviewed and updated as appropriate: allergies, current medications, past family history, past medical history, past social history, past surgical history and problem list     Review of Systems   Constitutional: Negative for chills, fatigue and fever  HENT: Positive for congestion, hoarse voice, postnasal drip, sinus pressure and sore throat  Negative for ear discharge, ear pain, rhinorrhea, sinus pain and trouble swallowing  Respiratory: Positive for cough  Negative for shortness of breath  Gastrointestinal: Negative for abdominal pain, diarrhea and vomiting  Neurological: Negative for headaches           Current "Outpatient Medications   Medication Sig Dispense Refill   • amoxicillin-clavulanate (AUGMENTIN) 500-125 mg per tablet Take 1 tablet by mouth every 12 (twelve) hours for 10 days 20 tablet 0   • cetirizine (ZyrTEC) 10 MG chewable tablet Chew 10 mg daily     • Ferrous Sulfate (IRON PO) Take by mouth in the morning     • fluticasone (FLONASE) 50 mcg/act nasal spray 2 sprays into each nostril daily 16 g 0   • multivitamin (THERAGRAN) TABS Take 1 tablet by mouth daily     • Omega-3 Fatty Acids (FISH OIL PO) Take by mouth in the morning     • mupirocin (BACTROBAN) 2 % ointment LIBERALLY APPLY TO AFFECTED AREA BID (Patient not taking: Reported on 4/4/2023)       No current facility-administered medications for this visit  Objective:    /78 (BP Location: Left arm, Patient Position: Sitting, Cuff Size: Large)   Pulse 91   Temp 97 5 °F (36 4 °C) (Temporal)   Resp 12   Ht 5' 4\" (1 626 m)   Wt 68 9 kg (152 lb)   SpO2 98%   BMI 26 09 kg/m²        Physical Exam  Vitals reviewed  Constitutional:       General: He is not in acute distress  Appearance: He is well-developed  He is not diaphoretic  HENT:      Head: Normocephalic and atraumatic  Right Ear: Ear canal and external ear normal  No drainage, swelling or tenderness  A middle ear effusion is present  Tympanic membrane is erythematous  Left Ear: Ear canal and external ear normal  No drainage, swelling or tenderness  A middle ear effusion is present  Tympanic membrane is erythematous  Nose: Mucosal edema and rhinorrhea present  Right Sinus: No maxillary sinus tenderness or frontal sinus tenderness  Left Sinus: No maxillary sinus tenderness or frontal sinus tenderness  Mouth/Throat:      Pharynx: Uvula midline  Posterior oropharyngeal erythema present  No oropharyngeal exudate  Eyes:      General:         Right eye: No discharge  Left eye: No discharge        Conjunctiva/sclera: Conjunctivae normal    Neck:      " Thyroid: No thyromegaly  Cardiovascular:      Rate and Rhythm: Normal rate and regular rhythm  Heart sounds: Normal heart sounds  Pulmonary:      Effort: Pulmonary effort is normal  No respiratory distress  Breath sounds: Normal breath sounds  No decreased breath sounds, wheezing, rhonchi or rales  Musculoskeletal:      Cervical back: Normal range of motion and neck supple  Lymphadenopathy:      Cervical: No cervical adenopathy  Skin:     General: Skin is warm and dry  Findings: No rash  Neurological:      Mental Status: He is alert and oriented to person, place, and time  Psychiatric:         Behavior: Behavior normal          Thought Content:  Thought content normal                 CONSTANTINE Moran

## 2023-06-30 ENCOUNTER — APPOINTMENT (OUTPATIENT)
Dept: RADIOLOGY | Facility: CLINIC | Age: 23
End: 2023-06-30
Payer: COMMERCIAL

## 2023-06-30 ENCOUNTER — OFFICE VISIT (OUTPATIENT)
Dept: URGENT CARE | Facility: CLINIC | Age: 23
End: 2023-06-30
Payer: COMMERCIAL

## 2023-06-30 VITALS
OXYGEN SATURATION: 100 % | HEIGHT: 65 IN | HEART RATE: 79 BPM | TEMPERATURE: 97.5 F | WEIGHT: 154.6 LBS | BODY MASS INDEX: 25.76 KG/M2 | DIASTOLIC BLOOD PRESSURE: 70 MMHG | RESPIRATION RATE: 18 BRPM | SYSTOLIC BLOOD PRESSURE: 110 MMHG

## 2023-06-30 DIAGNOSIS — S99.922A TOE INJURY, LEFT, INITIAL ENCOUNTER: ICD-10-CM

## 2023-06-30 DIAGNOSIS — S99.922A TOE INJURY, LEFT, INITIAL ENCOUNTER: Primary | ICD-10-CM

## 2023-06-30 PROCEDURE — 73660 X-RAY EXAM OF TOE(S): CPT

## 2023-06-30 PROCEDURE — 99213 OFFICE O/P EST LOW 20 MIN: CPT | Performed by: PREVENTIVE MEDICINE

## 2023-06-30 NOTE — PATIENT INSTRUCTIONS
At least twice soak the toe in warm water and table salt to drain the remaining blood  Elevate and ice the toe is much as possible  Limited weightbearing for a few days  Can try gisela taping the big toe to the toes next to it to see if that gives relief  You might want to walk with 1 crutch  Try the cam boot that you have at home

## 2023-06-30 NOTE — PROGRESS NOTES
[facility]        NAME: Ibeth Snyder is a 25 y o  male  : 2000    MRN: [id number]  DATE: 2023  TIME: 9:42 AM    Assessment and Plan   Toe injury, left, initial encounter [J15 [id number]]  1  Toe injury, left, initial encounter  XR toe left great min 2 views            Patient Instructions       Follow up with PCP in 3-5 days  Proceed to  ER if symptoms worsen  Chief Complaint     Chief Complaint   Patient presents with   • Toe Injury     Pt here for left  big  toe injury pt states  he dropped  a 45 lb   wt on his  toe last  night  Pain 8/10  Pt used Advil  History of Present Illness       He dropped a heavy weight on his left big toe      Review of Systems   Review of Systems   Musculoskeletal: Positive for arthralgias and gait problem           Current Medications       Current Outpatient Medications:   •  cetirizine (ZyrTEC) 10 MG chewable tablet, Chew 10 mg daily, Disp: , Rfl:   •  Ferrous Sulfate (IRON PO), Take by mouth in the morning, Disp: , Rfl:   •  fluticasone (FLONASE) 50 mcg/act nasal spray, 2 sprays into each nostril daily, Disp: 16 g, Rfl: 0  •  multivitamin (THERAGRAN) TABS, Take 1 tablet by mouth daily, Disp: , Rfl:   •  Omega-3 Fatty Acids (FISH OIL PO), Take by mouth in the morning, Disp: , Rfl:     Current Allergies     Allergies as of 2023 - Reviewed 2023   Allergen Reaction Noted   • Sulfa antibiotics  10/23/2014            The following portions of the patient's history were reviewed and updated as appropriate: allergies, current medications, past family history, past medical history, past social history, past surgical history and problem list      Past Medical History:   Diagnosis Date   • Allergic        Past Surgical History:   Procedure Laterality Date   • MYRINGOTOMY W/ TUBES         Family History   Problem Relation Age of Onset   • No Known Problems Mother    • Hypertension Father         [employer]   • Substance Abuse Neg Hx    • "Mental illness Neg Hx          Medications have been verified  Objective   /70   Pulse 79   Temp 97 5 °F (36 4 °C)   Resp 18   Ht 5' 5\" (1 651 m)   Wt 70 1 kg (154 lb 9 6 oz)   SpO2 100%   BMI 25 73 kg/m²   No LMP for male patient  Physical Exam     Physical Exam  Musculoskeletal:      Comments:  The left great toe swollen with a subungual hematoma and hematoma around the nail         I&D performed relieve the sub ungual hematoma        "

## 2023-08-28 ENCOUNTER — OFFICE VISIT (OUTPATIENT)
Dept: FAMILY MEDICINE CLINIC | Facility: CLINIC | Age: 23
End: 2023-08-28
Payer: COMMERCIAL

## 2023-08-28 VITALS
WEIGHT: 156 LBS | TEMPERATURE: 97.7 F | RESPIRATION RATE: 12 BRPM | HEIGHT: 65 IN | BODY MASS INDEX: 25.99 KG/M2 | OXYGEN SATURATION: 97 % | DIASTOLIC BLOOD PRESSURE: 62 MMHG | SYSTOLIC BLOOD PRESSURE: 108 MMHG | HEART RATE: 76 BPM

## 2023-08-28 DIAGNOSIS — Z13.1 SCREENING FOR DIABETES MELLITUS: ICD-10-CM

## 2023-08-28 DIAGNOSIS — Z13.0 SCREENING FOR DEFICIENCY ANEMIA: ICD-10-CM

## 2023-08-28 DIAGNOSIS — Z11.4 SCREENING FOR HIV (HUMAN IMMUNODEFICIENCY VIRUS): ICD-10-CM

## 2023-08-28 DIAGNOSIS — Z11.59 NEED FOR HEPATITIS C SCREENING TEST: ICD-10-CM

## 2023-08-28 DIAGNOSIS — H93.8X2 SENSATION OF FULLNESS IN LEFT EAR: ICD-10-CM

## 2023-08-28 DIAGNOSIS — Z11.3 ROUTINE SCREENING FOR STI (SEXUALLY TRANSMITTED INFECTION): ICD-10-CM

## 2023-08-28 DIAGNOSIS — Z13.29 SCREENING FOR THYROID DISORDER: ICD-10-CM

## 2023-08-28 DIAGNOSIS — H65.193 OTHER NON-RECURRENT ACUTE NONSUPPURATIVE OTITIS MEDIA OF BOTH EARS: Primary | ICD-10-CM

## 2023-08-28 DIAGNOSIS — Z13.220 SCREENING FOR LIPID DISORDERS: ICD-10-CM

## 2023-08-28 PROCEDURE — 99213 OFFICE O/P EST LOW 20 MIN: CPT | Performed by: NURSE PRACTITIONER

## 2023-08-28 RX ORDER — PREDNISONE 20 MG/1
TABLET ORAL
Qty: 11 TABLET | Refills: 0 | Status: SHIPPED | OUTPATIENT
Start: 2023-08-28

## 2023-08-28 RX ORDER — AZITHROMYCIN 250 MG/1
TABLET, FILM COATED ORAL
Qty: 6 TABLET | Refills: 0 | Status: SHIPPED | OUTPATIENT
Start: 2023-08-28 | End: 2023-09-01

## 2023-08-28 NOTE — PATIENT INSTRUCTIONS
Increase fluid intake as tolerated  Rest and humidification   Continue medications as directed   - Flonase OTC 1-2 sprays each nostril daily PRN post nasal drip

## 2023-08-28 NOTE — PROGRESS NOTES
Assessment/Plan:    1. Other non-recurrent acute nonsuppurative otitis media of both ears  -     azithromycin (ZITHROMAX) 250 mg tablet; Take 2 tablets PO on day one, then take 1 tablet PO daily x's 4 days  -     predniSONE 20 mg tablet; Take 2 tablets PO daily x's 3 days, then take 1 tablet daily x's 3 days, then take 1/2 tablet daily x's 3 days    2. Sensation of fullness in left ear  -     predniSONE 20 mg tablet; Take 2 tablets PO daily x's 3 days, then take 1 tablet daily x's 3 days, then take 1/2 tablet daily x's 3 days    3. Need for hepatitis C screening test  -     Hepatitis C Antibody; Future  -     Hepatitis C Antibody    4. Screening for HIV (human immunodeficiency virus)  -     HIV 1/2 Antigen/Antibody (Fourth Generation) with Reflex Testing ([facility], [facility], or EXTERNAL LAB); Future    5. Routine screening for STI (sexually transmitted infection)  -     Chlamydia/GC amplified DNA by PCR; Future  -     Chlamydia/GC amplified DNA by PCR    6. Screening for deficiency anemia  -     CBC and differential; Future  -     CBC and differential    7. Screening for diabetes mellitus  -     Comprehensive metabolic panel; Future  -     Comprehensive metabolic panel    8. Screening for thyroid disorder  -     TSH, 3rd generation; Future  -     TSH, 3rd generation    9. Screening for lipid disorders  -     Lipid panel; Future  -     Lipid panel            Patient Instructions   Increase fluid intake as tolerated  Rest and humidification   Continue medications as directed   - Flonase OTC 1-2 sprays each nostril daily PRN post nasal drip       Return for Annual physical.    Subjective:      Patient ID: Carol Antonio is a 25 y.o. male. Chief Complaint   Patient presents with   • Cold Like Symptoms     Ot c/o left ear ringing and head congestion for the past two weeks. Ear Fullness   There is pain in the left ear. This is a new problem. The current episode started 1 to 4 weeks ago (2 weeks ago).  The problem occurs constantly. The problem has been unchanged. There has been no fever. The patient is experiencing no pain. Associated symptoms include rhinorrhea. Pertinent negatives include no coughing, ear discharge, hearing loss or sore throat. He has tried nothing for the symptoms. The treatment provided no relief. His past medical history is significant for a tympanostomy tube. The following portions of the patient's history were reviewed and updated as appropriate: allergies, current medications, past family history, past medical history, past social history, past surgical history and problem list.    Review of Systems   Constitutional: Negative for chills, diaphoresis, fatigue and fever. HENT: Positive for congestion and rhinorrhea. Negative for ear discharge, ear pain (ear fullness of left ear), hearing loss, postnasal drip, sinus pressure, sinus pain and sore throat. Respiratory: Negative for cough, chest tightness, shortness of breath and wheezing. Cardiovascular: Negative for chest pain. Current Outpatient Medications   Medication Sig Dispense Refill   • azithromycin (ZITHROMAX) 250 mg tablet Take 2 tablets PO on day one, then take 1 tablet PO daily x's 4 days 6 tablet 0   • cetirizine (ZyrTEC) 10 MG chewable tablet Chew 10 mg daily     • Ferrous Sulfate (IRON PO) Take by mouth in the morning     • fluticasone (FLONASE) 50 mcg/act nasal spray 2 sprays into each nostril daily 16 g 0   • multivitamin (THERAGRAN) TABS Take 1 tablet by mouth daily     • Omega-3 Fatty Acids (FISH OIL PO) Take by mouth in the morning     • predniSONE 20 mg tablet Take 2 tablets PO daily x's 3 days, then take 1 tablet daily x's 3 days, then take 1/2 tablet daily x's 3 days 11 tablet 0     No current facility-administered medications for this visit.        Objective:    /62 (BP Location: Right arm, Patient Position: Sitting, Cuff Size: Adult)   Pulse 76   Temp 97.7 °F (36.5 °C) (Temporal)   Resp 12   Ht 5' 5" (1.651 m)   Wt 70.8 kg (156 lb)   SpO2 97%   BMI 25.96 kg/m²        Physical Exam  Vitals reviewed. Constitutional:       General: He is not in acute distress. Appearance: Normal appearance. He is well-developed. He is not diaphoretic. HENT:      Head: Normocephalic and atraumatic. Right Ear: Ear canal and external ear normal. Swelling present. No drainage or tenderness. No middle ear effusion. Tympanic membrane is scarred and erythematous. Left Ear: Ear canal and external ear normal. Swelling present. No drainage or tenderness. No middle ear effusion. Tympanic membrane is scarred and erythematous. Nose: No mucosal edema or rhinorrhea. Right Sinus: No maxillary sinus tenderness or frontal sinus tenderness. Left Sinus: No maxillary sinus tenderness or frontal sinus tenderness. Mouth/Throat:      Pharynx: Uvula midline. No oropharyngeal exudate or posterior oropharyngeal erythema. Eyes:      General:         Right eye: No discharge. Left eye: No discharge. Conjunctiva/sclera: Conjunctivae normal.   Neck:      Thyroid: No thyromegaly. Cardiovascular:      Rate and Rhythm: Normal rate and regular rhythm. Heart sounds: Normal heart sounds. Pulmonary:      Effort: Pulmonary effort is normal. No respiratory distress. Breath sounds: Normal breath sounds. No decreased breath sounds, wheezing, rhonchi or rales. Musculoskeletal:      Cervical back: Full passive range of motion without pain, normal range of motion and neck supple. Lymphadenopathy:      Cervical: Cervical adenopathy present. Right cervical: Superficial cervical adenopathy present. Skin:     General: Skin is warm and dry. Findings: No rash. Neurological:      Mental Status: He is alert and oriented to person, place, and time. Psychiatric:         Behavior: Behavior normal.         Thought Content:  Thought content normal.                CONSTANTINE Orellana

## 2024-02-21 PROBLEM — L01.00 IMPETIGO: Status: RESOLVED | Noted: 2019-07-17 | Resolved: 2024-02-21

## 2024-03-19 ENCOUNTER — OFFICE VISIT (OUTPATIENT)
Dept: URGENT CARE | Facility: CLINIC | Age: 24
End: 2024-03-19
Payer: COMMERCIAL

## 2024-03-19 VITALS
WEIGHT: 159.2 LBS | BODY MASS INDEX: 26.49 KG/M2 | SYSTOLIC BLOOD PRESSURE: 120 MMHG | TEMPERATURE: 99 F | HEART RATE: 93 BPM | RESPIRATION RATE: 18 BRPM | OXYGEN SATURATION: 98 % | DIASTOLIC BLOOD PRESSURE: 78 MMHG

## 2024-03-19 DIAGNOSIS — J02.0 ACUTE STREPTOCOCCAL PHARYNGITIS: Primary | ICD-10-CM

## 2024-03-19 LAB — S PYO AG THROAT QL: POSITIVE

## 2024-03-19 PROCEDURE — 99213 OFFICE O/P EST LOW 20 MIN: CPT | Performed by: FAMILY MEDICINE

## 2024-03-19 PROCEDURE — 87880 STREP A ASSAY W/OPTIC: CPT | Performed by: FAMILY MEDICINE

## 2024-03-19 RX ORDER — AMOXICILLIN 500 MG/1
CAPSULE ORAL
COMMUNITY
Start: 2024-02-15 | End: 2024-03-19 | Stop reason: ALTCHOICE

## 2024-03-19 RX ORDER — PENICILLIN V POTASSIUM 500 MG/1
500 TABLET ORAL EVERY 12 HOURS
Qty: 20 TABLET | Refills: 0 | Status: SHIPPED | OUTPATIENT
Start: 2024-03-19 | End: 2024-03-29

## 2024-03-19 RX ORDER — DEXAMETHASONE 4 MG/1
TABLET ORAL
COMMUNITY
Start: 2024-02-15 | End: 2024-03-19 | Stop reason: ALTCHOICE

## 2024-03-19 NOTE — LETTER
March 19, 2024     Patient: Eugenio Pacheco   YOB: 2000   Date of Visit: 3/19/2024       To Whom it May Concern:    Eugenio Pacheco was seen in my clinic on 3/19/2024. Please excuse from work for 3/19/2024.    If you have any questions or concerns, please don't hesitate to call.         Sincerely,       Alejandro Erickson MD

## 2024-03-19 NOTE — PATIENT INSTRUCTIONS
Acute Streptococcal Pharyngitis  - rapid strep test performed in office today is positive   - Penicillin VK x 10 days prescribed, to be completed as directed  - take Tylenol or Motrin as needed for pain/fever  - patient is to rest and drink plenty of fluids   - advised warm salt water gargles and throat lozenges as needed   - drink warm tea w/ lemon and honey   - may use Chloraseptic throat spray as needed   - follow up w/ PCP office for re-check in 3-5 days  - if symptoms persist despite treatment, worsen, or any new symptoms present, patient is to be seen in the ER.

## 2024-03-19 NOTE — PROGRESS NOTES
[facility]        NAME: Eugenio Pacheco is a 23 y.o. male  : 2000    MRN: [id number]  DATE: 2024  TIME: 2:37 PM    Assessment and Plan   Acute streptococcal pharyngitis [J02.0]  1. Acute streptococcal pharyngitis  POCT rapid strepA    penicillin V potassium (VEETID) 500 mg tablet            Patient Instructions     Patient Instructions   Acute Streptococcal Pharyngitis  - rapid strep test performed in office today is positive   - Penicillin VK x 10 days prescribed, to be completed as directed  - take Tylenol or Motrin as needed for pain/fever  - patient is to rest and drink plenty of fluids   - advised warm salt water gargles and throat lozenges as needed   - drink warm tea w/ lemon and honey   - may use Chloraseptic throat spray as needed   - follow up w/ PCP office for re-check in 3-5 days  - if symptoms persist despite treatment, worsen, or any new symptoms present, patient is to be seen in the ER.    Follow up with PCP in 3-5 days.  Proceed to  ER if symptoms worsen.    If tests have been performed at [facility], our office will contact you with results if changes need to be made to the care plan discussed with you at the visit.  You can review your full results on [facility] MyChart.    Chief Complaint     Chief Complaint   Patient presents with    Cold Like Symptoms     Pt ill since this morning, sore throat, congestion, tired, no fever. Pt used OTC cold med.         History of Present Illness       24 yo male, woke up this morning with a sore throat. He states he also feels nasal congestion and is tired. No fever/chills. No headache or body aches. Patient is not a smoker. No abdominal pain or GI sx. No skin rashes. No loss of taste or smell. No neck pain or swelling. No feelings of throat closing or difficulty swallowing. No drooling or muffled voice. No recent travel or known exposure to sick contacts. He has taken an OTC cough medication for the symptoms. Rapid strep test  performed in office today is positive.      Review of Systems   Review of Systems   Constitutional: Negative.    HENT:          As noted in HPI   Eyes: Negative.    Respiratory: Negative.     Cardiovascular: Negative.    Gastrointestinal: Negative.    Musculoskeletal: Negative.    Skin: Negative.    Allergic/Immunologic:        Sulfa   Neurological: Negative.    Hematological: Negative.          Current Medications       Current Outpatient Medications:     cetirizine (ZyrTEC) 10 MG chewable tablet, Chew 10 mg daily, Disp: , Rfl:     Ferrous Sulfate (IRON PO), Take by mouth in the morning, Disp: , Rfl:     fluticasone (FLONASE) 50 mcg/act nasal spray, 2 sprays into each nostril daily, Disp: 16 g, Rfl: 0    multivitamin (THERAGRAN) TABS, Take 1 tablet by mouth daily, Disp: , Rfl:     Omega-3 Fatty Acids (FISH OIL PO), Take by mouth in the morning, Disp: , Rfl:     penicillin V potassium (VEETID) 500 mg tablet, Take 1 tablet (500 mg total) by mouth every 12 (twelve) hours for 10 days, Disp: 20 tablet, Rfl: 0    Current Allergies     Allergies as of 03/19/2024 - Reviewed 03/19/2024   Allergen Reaction Noted    Sulfa antibiotics  10/23/2014            The following portions of the patient's history were reviewed and updated as appropriate: allergies, current medications, past family history, past medical history, past social history, past surgical history and problem list.     Past Medical History:   Diagnosis Date    Allergic        Past Surgical History:   Procedure Laterality Date    MYRINGOTOMY W/ TUBES      WISDOM TOOTH EXTRACTION      x3       Family History   Problem Relation Age of Onset    No Known Problems Mother     Hypertension Father         [employer]    Substance Abuse Neg Hx     Mental illness Neg Hx          Medications have been verified.        Objective   /78   Pulse 93   Temp 99 °F (37.2 °C)   Resp 18   Wt 72.2 kg (159 lb 3.2 oz)   SpO2 98%   BMI 26.49 kg/m²   No LMP for male patient.        Physical Exam     Physical Exam  Vitals and nursing note reviewed.   Constitutional:       General: He is awake. He is not in acute distress.     Appearance: Normal appearance. He is well-developed, well-groomed and normal weight. He is not ill-appearing, toxic-appearing or diaphoretic.   HENT:      Head: Normocephalic and atraumatic.      Jaw: There is normal jaw occlusion.      Right Ear: Tympanic membrane, ear canal and external ear normal.      Left Ear: Tympanic membrane, ear canal and external ear normal.      Nose: Nose normal.      Mouth/Throat:      Lips: Pink. No lesions.      Mouth: Mucous membranes are moist.      Pharynx: Uvula midline. Pharyngeal swelling and posterior oropharyngeal erythema present. No oropharyngeal exudate or uvula swelling.      Tonsils: Tonsillar exudate present. No tonsillar abscesses.      Comments: Erythema of pharynx and tonsils.   Both tonsils are enlarged.   Exudates present on both tonsils.   Airway fully patent.  Eyes:      General: Lids are normal.      Conjunctiva/sclera: Conjunctivae normal.   Neck:      Trachea: Trachea and phonation normal.   Cardiovascular:      Rate and Rhythm: Normal rate.      Pulses: Normal pulses.   Pulmonary:      Effort: Pulmonary effort is normal. No tachypnea, accessory muscle usage or respiratory distress.   Musculoskeletal:      Cervical back: Neck supple. No edema, erythema, rigidity or tenderness.   Lymphadenopathy:      Cervical: No cervical adenopathy.   Skin:     General: Skin is warm and dry.      Capillary Refill: Capillary refill takes less than 2 seconds.      Coloration: Skin is not pale.      Findings: No rash.   Neurological:      Mental Status: He is alert and oriented to person, place, and time. Mental status is at baseline.   Psychiatric:         Mood and Affect: Mood normal.         Behavior: Behavior normal. Behavior is cooperative.         Thought Content: Thought content normal.         Judgment: Judgment normal.

## 2025-03-18 ENCOUNTER — OFFICE VISIT (OUTPATIENT)
Dept: URGENT CARE | Facility: CLINIC | Age: 25
End: 2025-03-18
Payer: COMMERCIAL

## 2025-03-18 VITALS
HEART RATE: 81 BPM | DIASTOLIC BLOOD PRESSURE: 72 MMHG | WEIGHT: 158 LBS | HEIGHT: 66 IN | TEMPERATURE: 98.3 F | OXYGEN SATURATION: 97 % | RESPIRATION RATE: 20 BRPM | SYSTOLIC BLOOD PRESSURE: 112 MMHG | BODY MASS INDEX: 25.39 KG/M2

## 2025-03-18 DIAGNOSIS — J06.9 VIRAL URI WITH COUGH: ICD-10-CM

## 2025-03-18 DIAGNOSIS — H66.91 ACUTE RIGHT OTITIS MEDIA: Primary | ICD-10-CM

## 2025-03-18 PROCEDURE — 99213 OFFICE O/P EST LOW 20 MIN: CPT | Performed by: FAMILY MEDICINE

## 2025-03-18 RX ORDER — AMOXICILLIN 500 MG/1
500 CAPSULE ORAL EVERY 12 HOURS SCHEDULED
Qty: 14 CAPSULE | Refills: 0 | Status: SHIPPED | OUTPATIENT
Start: 2025-03-18 | End: 2025-03-25

## 2025-03-18 NOTE — LETTER
March 18, 2025     Patient: Eugenio Pacheco   YOB: 2000   Date of Visit: 3/18/2025       To Whom it May Concern:    Eugenio Pacheco was seen in my clinic on 3/18/2025.     If you have any questions or concerns, please don't hesitate to call.         Sincerely,          Alejandro Erickson MD        CC: No Recipients

## 2025-03-18 NOTE — PATIENT INSTRUCTIONS
- Amoxicillin prescribed for ear ear infection, take as directed.   - patient is to rest and drink plenty of fluids  - take Tylenol or Motrin as needed for pain/fever   - advised warm salt water gargles and throat lozenges as needed   - drink warm tea w/ lemon and honey   - run a humidifier at home to help w/ congestion   - advised using Flonase nasal spray to help w/ nasal/sinus symptoms   - may take Mucinex as needed for cough/chest congestion   - if symptoms persist despite treatment, worsen, or any new symptoms present, should be seen by PCP office for re-check.

## 2025-03-18 NOTE — PROGRESS NOTES
[facility]        NAME: Eugenio Pacheco is a 24 y.o. male  : 2000    MRN: [id number]  DATE: 2025  TIME: 12:54 PM    Assessment and Plan   Acute right otitis media [H66.91]  1. Acute right otitis media  amoxicillin (AMOXIL) 500 mg capsule      2. Viral URI with cough          Patient Instructions     Patient Instructions   - Amoxicillin prescribed for ear ear infection, take as directed.   - patient is to rest and drink plenty of fluids  - take Tylenol or Motrin as needed for pain/fever   - advised warm salt water gargles and throat lozenges as needed   - drink warm tea w/ lemon and honey   - run a humidifier at home to help w/ congestion   - advised using Flonase nasal spray to help w/ nasal/sinus symptoms   - may take Mucinex as needed for cough/chest congestion   - if symptoms persist despite treatment, worsen, or any new symptoms present, should be seen by PCP office for re-check.    Follow up with PCP in 3-5 days.  Proceed to  ER if symptoms worsen.    If tests have been performed at [facility], our office will contact you with results if changes need to be made to the care plan discussed with you at the visit.  [facility]    Chief Complaint     Chief Complaint   Patient presents with    Cold Like Symptoms     Pt ill x24 hours -- chills, congestion, sore throat, cough, ear pain. Pt used Dayquil and Mucinex.     History of Present Illness     25 yo male presents c/o chills, nasal congestion, R ear pain, post-nasal drip, sore throat, and productive cough. He has been ill x 1 day. No fevers, headache, or body aches. No chest pain, SOB, or wheezing. Patient is not a smoker. No GI sx. No recent travel or known sick contacts. Patient has taken Dayquil and Mucinex for the symptoms.      Review of Systems   Review of Systems   Constitutional:  Positive for chills.   HENT:  Positive for congestion, ear pain, postnasal drip and sore throat.    Eyes:  "Negative.    Respiratory:  Positive for cough.    Cardiovascular: Negative.    Gastrointestinal: Negative.    Musculoskeletal: Negative.    Skin: Negative.    Allergic/Immunologic:        Sulfa   Neurological: Negative.    Hematological: Negative.      Current Medications       Current Outpatient Medications:     amoxicillin (AMOXIL) 500 mg capsule, Take 1 capsule (500 mg total) by mouth every 12 (twelve) hours for 7 days, Disp: 14 capsule, Rfl: 0    cetirizine (ZyrTEC) 10 MG chewable tablet, Chew 10 mg daily, Disp: , Rfl:     Ferrous Sulfate (IRON PO), Take by mouth in the morning, Disp: , Rfl:     fluticasone (FLONASE) 50 mcg/act nasal spray, 2 sprays into each nostril daily, Disp: 16 g, Rfl: 0    multivitamin (THERAGRAN) TABS, Take 1 tablet by mouth daily, Disp: , Rfl:     Omega-3 Fatty Acids (FISH OIL PO), Take by mouth in the morning, Disp: , Rfl:     Current Allergies     Allergies as of 03/18/2025 - Reviewed 03/18/2025   Allergen Reaction Noted    Sulfa antibiotics  10/23/2014            The following portions of the patient's history were reviewed and updated as appropriate: allergies, current medications, past family history, past medical history, past social history, past surgical history and problem list.     Past Medical History:   Diagnosis Date    Allergic        Past Surgical History:   Procedure Laterality Date    MYRINGOTOMY W/ TUBES      WISDOM TOOTH EXTRACTION      x3       Family History   Problem Relation Age of Onset    No Known Problems Mother     Hypertension Father         [employer]    Substance Abuse Neg Hx     Mental illness Neg Hx          Medications have been verified.        Objective   /72 (Patient Position: Sitting, Cuff Size: Standard)   Pulse 81   Temp 98.3 °F (36.8 °C) (Tympanic)   Resp 20   Ht 5' 6\" (1.676 m)   Wt 71.7 kg (158 lb)   SpO2 97%   BMI 25.50 kg/m²   No LMP for male patient.       Physical Exam     Physical Exam  Vitals and nursing note reviewed. "   Constitutional:       General: He is awake. He is not in acute distress.     Appearance: Normal appearance. He is well-developed and well-groomed. He is not ill-appearing, toxic-appearing or diaphoretic.   HENT:      Head: Normocephalic and atraumatic.      Jaw: There is normal jaw occlusion.      Right Ear: Ear canal and external ear normal. Tympanic membrane is erythematous and bulging. Tympanic membrane is not perforated.      Left Ear: Tympanic membrane, ear canal and external ear normal.      Nose: Mucosal edema and congestion present.      Mouth/Throat:      Lips: Pink. No lesions.      Mouth: Mucous membranes are moist.      Pharynx: Uvula midline. Posterior oropharyngeal erythema and postnasal drip present. No pharyngeal swelling, oropharyngeal exudate or uvula swelling.      Tonsils: No tonsillar exudate or tonsillar abscesses.   Eyes:      General: Lids are normal.      Conjunctiva/sclera: Conjunctivae normal.   Neck:      Trachea: Trachea and phonation normal.   Cardiovascular:      Rate and Rhythm: Normal rate and regular rhythm.      Pulses: Normal pulses.      Heart sounds: Normal heart sounds.   Pulmonary:      Effort: Pulmonary effort is normal. No tachypnea, accessory muscle usage or respiratory distress.      Breath sounds: Normal breath sounds and air entry.   Musculoskeletal:      Cervical back: Neck supple. No edema, erythema, rigidity or tenderness.   Lymphadenopathy:      Cervical: No cervical adenopathy.   Skin:     General: Skin is warm and dry.      Capillary Refill: Capillary refill takes less than 2 seconds.      Coloration: Skin is not pale.   Neurological:      Mental Status: He is alert and oriented to person, place, and time. Mental status is at baseline.   Psychiatric:         Mood and Affect: Mood normal.         Behavior: Behavior normal. Behavior is cooperative.         Thought Content: Thought content normal.         Judgment: Judgment normal.